# Patient Record
Sex: FEMALE | Race: WHITE | URBAN - METROPOLITAN AREA
[De-identification: names, ages, dates, MRNs, and addresses within clinical notes are randomized per-mention and may not be internally consistent; named-entity substitution may affect disease eponyms.]

---

## 2017-02-04 ASSESSMENT — ENCOUNTER SYMPTOMS
ARTHRALGIAS: 1
NECK PAIN: 0
SKIN CHANGES: 0
NAIL CHANGES: 0
STIFFNESS: 1
JOINT SWELLING: 0
MUSCLE CRAMPS: 1
MYALGIAS: 1
POOR WOUND HEALING: 1
BACK PAIN: 0
MUSCLE WEAKNESS: 0

## 2017-02-13 ENCOUNTER — PRE VISIT (OUTPATIENT)
Dept: CARDIOLOGY | Facility: CLINIC | Age: 62
End: 2017-02-13

## 2017-02-13 DIAGNOSIS — I27.20 PULMONARY HYPERTENSION (H): ICD-10-CM

## 2017-02-15 ENCOUNTER — OFFICE VISIT (OUTPATIENT)
Dept: CARDIOLOGY | Facility: CLINIC | Age: 62
End: 2017-02-15
Attending: INTERNAL MEDICINE
Payer: COMMERCIAL

## 2017-02-15 VITALS
BODY MASS INDEX: 35.06 KG/M2 | HEIGHT: 67 IN | WEIGHT: 223.4 LBS | OXYGEN SATURATION: 99 % | DIASTOLIC BLOOD PRESSURE: 73 MMHG | SYSTOLIC BLOOD PRESSURE: 107 MMHG | HEART RATE: 59 BPM

## 2017-02-15 DIAGNOSIS — I50.9 CONGESTIVE HEART FAILURE, UNSPECIFIED CONGESTIVE HEART FAILURE CHRONICITY, UNSPECIFIED CONGESTIVE HEART FAILURE TYPE: Primary | ICD-10-CM

## 2017-02-15 DIAGNOSIS — I50.9 CONGESTIVE HEART FAILURE, UNSPECIFIED CONGESTIVE HEART FAILURE CHRONICITY, UNSPECIFIED CONGESTIVE HEART FAILURE TYPE: ICD-10-CM

## 2017-02-15 LAB
ANION GAP SERPL CALCULATED.3IONS-SCNC: 9 MMOL/L (ref 3–14)
BASOPHILS # BLD AUTO: 0.1 10E9/L (ref 0–0.2)
BASOPHILS NFR BLD AUTO: 0.7 %
BUN SERPL-MCNC: 17 MG/DL (ref 7–30)
CALCIUM SERPL-MCNC: 9.1 MG/DL (ref 8.5–10.1)
CHLORIDE SERPL-SCNC: 104 MMOL/L (ref 94–109)
CHOLEST SERPL-MCNC: 131 MG/DL
CO2 SERPL-SCNC: 29 MMOL/L (ref 20–32)
CREAT SERPL-MCNC: 0.78 MG/DL (ref 0.52–1.04)
DIFFERENTIAL METHOD BLD: NORMAL
EOSINOPHIL # BLD AUTO: 0.3 10E9/L (ref 0–0.7)
EOSINOPHIL NFR BLD AUTO: 4.1 %
ERYTHROCYTE [DISTWIDTH] IN BLOOD BY AUTOMATED COUNT: 13 % (ref 10–15)
GFR SERPL CREATININE-BSD FRML MDRD: 75 ML/MIN/1.7M2
GLUCOSE SERPL-MCNC: 95 MG/DL (ref 70–99)
HCT VFR BLD AUTO: 40.3 % (ref 35–47)
HDLC SERPL-MCNC: 89 MG/DL
HGB BLD-MCNC: 13.8 G/DL (ref 11.7–15.7)
IMM GRANULOCYTES # BLD: 0 10E9/L (ref 0–0.4)
IMM GRANULOCYTES NFR BLD: 0.1 %
LDLC SERPL CALC-MCNC: 31 MG/DL
LYMPHOCYTES # BLD AUTO: 2.5 10E9/L (ref 0.8–5.3)
LYMPHOCYTES NFR BLD AUTO: 37.1 %
MCH RBC QN AUTO: 32.4 PG (ref 26.5–33)
MCHC RBC AUTO-ENTMCNC: 34.2 G/DL (ref 31.5–36.5)
MCV RBC AUTO: 95 FL (ref 78–100)
MONOCYTES # BLD AUTO: 0.6 10E9/L (ref 0–1.3)
MONOCYTES NFR BLD AUTO: 8.7 %
NEUTROPHILS # BLD AUTO: 3.3 10E9/L (ref 1.6–8.3)
NEUTROPHILS NFR BLD AUTO: 49.3 %
NONHDLC SERPL-MCNC: 42 MG/DL
NRBC # BLD AUTO: 0 10*3/UL
NRBC BLD AUTO-RTO: 0 /100
PLATELET # BLD AUTO: 221 10E9/L (ref 150–450)
POTASSIUM SERPL-SCNC: 3.9 MMOL/L (ref 3.4–5.3)
RBC # BLD AUTO: 4.26 10E12/L (ref 3.8–5.2)
SODIUM SERPL-SCNC: 141 MMOL/L (ref 133–144)
TRIGL SERPL-MCNC: 55 MG/DL
TSH SERPL DL<=0.005 MIU/L-ACNC: 3.53 MU/L (ref 0.4–4)
WBC # BLD AUTO: 6.8 10E9/L (ref 4–11)

## 2017-02-15 PROCEDURE — 99212 OFFICE O/P EST SF 10 MIN: CPT

## 2017-02-15 PROCEDURE — 36415 COLL VENOUS BLD VENIPUNCTURE: CPT | Performed by: INTERNAL MEDICINE

## 2017-02-15 PROCEDURE — 80061 LIPID PANEL: CPT | Performed by: INTERNAL MEDICINE

## 2017-02-15 PROCEDURE — 84443 ASSAY THYROID STIM HORMONE: CPT | Performed by: INTERNAL MEDICINE

## 2017-02-15 PROCEDURE — 99214 OFFICE O/P EST MOD 30 MIN: CPT | Mod: ZP | Performed by: INTERNAL MEDICINE

## 2017-02-15 PROCEDURE — 85025 COMPLETE CBC W/AUTO DIFF WBC: CPT | Performed by: INTERNAL MEDICINE

## 2017-02-15 PROCEDURE — 80048 BASIC METABOLIC PNL TOTAL CA: CPT | Performed by: INTERNAL MEDICINE

## 2017-02-15 RX ORDER — METOPROLOL SUCCINATE 50 MG/1
50 TABLET, EXTENDED RELEASE ORAL DAILY
Qty: 90 TABLET | Refills: 3 | Status: SHIPPED | OUTPATIENT
Start: 2017-02-15

## 2017-02-15 RX ORDER — CETIRIZINE HYDROCHLORIDE 10 MG/1
10 TABLET ORAL DAILY
COMMUNITY
Start: 2014-01-01

## 2017-02-15 RX ORDER — VALSARTAN 80 MG/1
80 TABLET ORAL DAILY
COMMUNITY
Start: 2016-01-01 | End: 2017-02-15

## 2017-02-15 RX ORDER — FUROSEMIDE 40 MG
40 TABLET ORAL DAILY
Qty: 90 TABLET | Refills: 3 | Status: SHIPPED | OUTPATIENT
Start: 2017-02-15

## 2017-02-15 RX ORDER — CHLORAL HYDRATE 500 MG
1500 CAPSULE ORAL DAILY
COMMUNITY
Start: 2015-01-01

## 2017-02-15 RX ORDER — METOPROLOL SUCCINATE 50 MG/1
50 TABLET, EXTENDED RELEASE ORAL DAILY
COMMUNITY
Start: 2016-03-18 | End: 2017-02-15

## 2017-02-15 RX ORDER — VALSARTAN 80 MG/1
80 TABLET ORAL DAILY
Qty: 90 TABLET | Refills: 3 | Status: SHIPPED | OUTPATIENT
Start: 2017-02-15

## 2017-02-15 RX ORDER — POTASSIUM CHLORIDE 1500 MG/1
20 TABLET, EXTENDED RELEASE ORAL DAILY
Qty: 90 TABLET | Refills: 3 | Status: SHIPPED | OUTPATIENT
Start: 2017-02-15

## 2017-02-15 RX ORDER — FEXOFENADINE HCL 180 MG/1
180 TABLET ORAL
COMMUNITY
Start: 2014-01-01

## 2017-02-15 ASSESSMENT — PAIN SCALES - GENERAL: PAINLEVEL: NO PAIN (0)

## 2017-02-15 NOTE — NURSING NOTE
Chief Complaint   Patient presents with     Follow Up For     heart problem--61 year old female with history of nonischemic cardiomyopathy presenting for follow up

## 2017-02-15 NOTE — MR AVS SNAPSHOT
After Visit Summary   2/15/2017    Glenna Kingston    MRN: [id number]           Patient Information     Date Of Birth          1955        Visit Information        Provider Department      2/15/2017 2:30 PM Joseph Pedroza MD [facility]        Today's Diagnoses     CHF (congestive heart failure) (H)    -  1      Care Instructions    You were seen today in the Cardiovascular Clinic at the [facility].     Cardiology Providers you saw during your visit: Dr Joseph Pedroza    Diagnosis: History of Cardiomyopathy    Results: No testing at this time    Recommendations:   1.  We will draw labs today- Lipids, CBC, BMP and TSH    Follow-up: No follow up needed at this time.        For emergencies call 911.    For any scheduling needs or nursing related questions, please call [phone].    Thank you for your visit today! If you have questions or concerns about today's visit, please call me.      Kulwinder Rodas RN  RN Care Coordinator  [facility]  [phone]    Press option 1 for the [employer] and then 3 for nursing related questions                Follow-ups after your visit        Follow-up notes from your care team     Return for Dr Pedroza Follow up Dx: CHF.      Future tests that were ordered for you today     Open Future Orders        Priority Expected Expires Ordered    Lipid panel reflex to direct LDL Routine  2/15/2017 2/15/2017    Basic metabolic panel Routine  2/15/2017 2/15/2017    CBC with platelets differential Routine  2/15/2017 2/15/2017    TSH with free T4 reflex Routine  2/15/2017 2/15/2017            Who to contact     If you have questions or need follow up information about today's clinic visit or your schedule please contact [facility] directly at [phone].  Normal or non-critical lab and imaging results will be communicated to you by MyChart, letter or phone within 4 business days after the clinic has received the results.  "If you do not hear from us within 7 days, please contact the clinic through [employer] or phone. If you have a critical or abnormal lab result, we will notify you by phone as soon as possible.  Submit refill requests through [employer] or call your pharmacy and they will forward the refill request to us. Please allow 3 business days for your refill to be completed.          Additional Information About Your Visit        [employer] Information     [employer] gives you secure access to your electronic health record. If you see a primary care provider, you can also send messages to your care team and make appointments. If you have questions, please call your primary care clinic.  If you do not have a primary care provider, please call [phone] and they will assist you.        Care EveryWhere ID     This is your Care EveryWhere ID. This could be used by other organizations to access your [place] medical records  [id number]        Your Vitals Were     Pulse Height Pulse Oximetry BMI (Body Mass Index)          59 1.702 m (5' 7\") 99% 34.99 kg/m2         Blood Pressure from Last 3 Encounters:   02/15/17 107/73   01/07/15 129/82   01/20/12 132/77    Weight from Last 3 Encounters:   02/15/17 101.3 kg (223 lb 6.4 oz)   01/07/15 128.5 kg (283 lb 4.8 oz)   01/20/12 131.3 kg (289 lb 6.4 oz)                 Where to get your medicines      These medications were sent to [id number] [address] AT [place] [address] [address], [place] [address]     Phone:  [phone]     furosemide 40 MG tablet    metoprolol 50 MG 24 hr tablet    potassium chloride SA 20 MEQ CR tablet    valsartan 80 MG tablet          Primary Care Provider Office Phone # Fax #    Shelia Vaughn [phone]       [facility] [address] [facility] [address]        Thank you!     Thank you for choosing [facility]  for your care. Our goal is always to " provide you with excellent care. Hearing back from our patients is one way we can continue to improve our services. Please take a few minutes to complete the written survey that you may receive in the mail after your visit with us. Thank you!             Your Updated Medication List - Protect others around you: Learn how to safely use, store and throw away your medicines at www.disposemymeds.org.          This list is accurate as of: 2/15/17  2:49 PM.  Always use your most recent med list.                   Brand Name Dispense Instructions for use    ALEVE 220 MG tablet   Generic drug:  naproxen sodium      Take 220 mg by mouth daily.       aspirin 81 MG tablet      Take 1 tablet by mouth daily.       cetirizine 10 MG tablet    zyrTEC     Take 10 mg by mouth daily       DAILY MULTIVITAMIN PO      Take  by mouth.       fexofenadine 180 MG tablet    ALLEGRA     Take 180 mg by mouth       furosemide 40 MG tablet    LASIX    90 tablet    Take 1 tablet (40 mg) by mouth daily       loratadine 10 MG capsule      Take 10 mg by mouth daily.       metoprolol 50 MG 24 hr tablet    TOPROL-XL    90 tablet    Take 1 tablet (50 mg) by mouth daily       Omega-3 Fish Oil 1000 MG Caps      Take 1,500 mg by mouth daily       potassium chloride SA 20 MEQ CR tablet    K-DUR/KLOR-CON M    90 tablet    Take 1 tablet (20 mEq) by mouth daily Only when taking furosemide       PROBIOTIC DAILY PO          valsartan 80 MG tablet    DIOVAN    90 tablet    Take 1 tablet (80 mg) by mouth daily

## 2017-02-15 NOTE — PATIENT INSTRUCTIONS
You were seen today in the Cardiovascular Clinic at the [facility].     Cardiology Providers you saw during your visit: Dr Joseph Pedroza    Diagnosis: History of Cardiomyopathy    Results: No testing at this time    Recommendations:   1.  We will draw labs today- Lipids, CBC, BMP and TSH    Follow-up: No follow up needed at this time.        For emergencies call 911.    For any scheduling needs or nursing related questions, please call [phone].    Thank you for your visit today! If you have questions or concerns about today's visit, please call me.      Kulwinder Rodas RN  RN Care Coordinator  [facility]  [phone]    Press option 1 for the [place] and then 3 for nursing related questions

## 2017-02-15 NOTE — PROGRESS NOTES
February 15, 2017      Shelia Vaughn PA-C    [facility]    [address] [place] [address]       RE: Glenna Kingston   MRN: [id number]   : 1955      Dear Ms. Vaughn:      It was a pleasure participating in the care of your patient, Ms. Glenna Kingston.  As you know, she is a 61-year-old lady who I see today for a prior nonischemic cardiomyopathy since resolved.      Her past medical history is significant for the followin.  Staph skin infections with debridement of leg abscess in the past.   2.  Psoriasis.   3.  Anxiety.      Her cardiac history is significant for a nonischemic cardiomyopathy in  which apparently resolved.  She saw Dr. Virginia Villagomez for this and had a right heart cath, at which time it did not show evidence for pulmonary hypertension or decline in her cardiac output.  She was maintained on beta blocker and angiotensin receptor blocker and instructed to continue to follow up.      I last saw her 2015, and at that time she was doing well.  She presents today for continuing care.      Since our last visit, she has managed to lose 60 pounds through diet and exercise.  She is feeling well and has questions regarding her overall medical regimen.      In terms of symptoms, she can walk for 30 minutes to an hour without stopping.  She denies gross chest pains, shortness of breath, PND, orthopnea, edema, palpitations, syncope or near-syncope.      In terms of her current medications, she is takin.  Metoprolol succinate 50 mg a day.   2.  Diovan 80 mg a day.   3.  Fish oil 1500 mg a day.   4.  Aspirin 81 mg a day.   5.  Lasix 40 mg a day.   6.  Potassium 20 mEq a day.      PHYSICAL EXAMINATION:     VITAL SIGNS:  Her blood pressure is 107/73 with a pulse of 59.  Weight is 223 pounds.   NECK:  Exam reveals a normal jugular venous pressure, no significant hepatojugular reflux is identified.  Her peripheral veins do not remain engorged above the cardiac  level.   LUNGS:  Clear to auscultation.  Respiratory effort is normal.   CARDIAC:  Reveals a regular rate and rhythm, no obvious murmur or gallop appreciated.   ABDOMEN:  Belly soft, nontender.   EXTREMITIES:  Left lower extremity is significant for +2 nonpitting edema, right lower extremity +1 nonpitting edema.        IMPRESSION:      Glenna is a 61-year-old lady whose cardiac history is significant for a verbal history of a cardiomyopathy of unclear etiology that resolved apparently back in 2004.  Documentation of this cardiomyopathy and accompanying workup is not available for review.      She saw Dr. Villagomez in 2011 and had a documented normal ejection fraction at that time.  She had a normal dobutamine stress echocardiogram 01/19/2015.      She presents today with questions regarding her overall medical regimen; however, from a clinical standpoint, she is currently asymptomatic at a moderate level of exertion.  She is currently euvolemic and doing well from an exertional standpoint.        PLAN:     1.  Continue present medications at present doses.     2.  She is fasting today and requests us to check a fasting lipid profile along with some routine labs including kidney function and potassium levels while she is on Lasix.     3.  She will also keep track of her blood pressures at home and gather information regarding what her blood pressures are in the resting state on her current medical regimen.  Further adjustments can be contemplated based on these results; however, in the context of her presumed nonischemic cardiomyopathy that required significant medical management in the past, I would not completely discontinue any medications and would hesitate to decrease her current doses unless she was experiencing side effects or other adverse symptomatology from them considering how favorable her clinical course has been thus far.      If her tests and clinical course are unremarkable, then she can follow up  with you on a long-term basis and can see me on an as-needed basis in the future.      Once again, it was a pleasure participating in the care of your patient, Ms. Glenna Kingston.  Please feel free to contact me anytime if you questions regarding her care in the future.      Sincerely,            Joseph Pedroza MD

## 2017-02-15 NOTE — LETTER
2/15/2017      RE: Glenna Kingston  [address] [facility] [address]       Dear Colleague,    Thank you for the opportunity to participate in the care of your patient, Glenna Kingston, at the [facility]. Please see a copy of my visit note below.    February 15, 2017      Shelia Vaughn PA-C    [facility]    [address] [place]  [address]       RE: Glenna Kingston   MRN: [id number]   : 1955      Dear MsDebo Roderick:      It was a pleasure participating in the care of your patient, Ms. Glenna Kingston.  As you know, she is a 61-year-old lady who I see today for a prior nonischemic cardiomyopathy since resolved.      Her past medical history is significant for the followin.  Staph skin infections with debridement of leg abscess in the past.   2.  Psoriasis.   3.  Anxiety.      Her cardiac history is significant for a nonischemic cardiomyopathy in  which apparently resolved.  She saw Dr. Virginia Villagomez for this and had a right heart cath, at which time it did not show evidence for pulmonary hypertension or decline in her cardiac output.  She was maintained on beta blocker and angiotensin receptor blocker and instructed to continue to follow up.      I last saw her 2015, and at that time she was doing well.  She presents today for continuing care.      Since our last visit, she has managed to lose 60 pounds through diet and exercise.  She is feeling well and has questions regarding her overall medical regimen.      In terms of symptoms, she can walk for 30 minutes to an hour without stopping.  She denies gross chest pains, shortness of breath, PND, orthopnea, edema, palpitations, syncope or near-syncope.      In terms of her current medications, she is takin.  Metoprolol succinate 50 mg a day.   2.  Diovan 80 mg a day.   3.  Fish oil 1500 mg a day.   4.  Aspirin 81 mg a day.   5.  Lasix 40 mg a day.   6.   Potassium 20 mEq a day.      PHYSICAL EXAMINATION:     VITAL SIGNS:  Her blood pressure is 107/73 with a pulse of 59.  Weight is 223 pounds.   NECK:  Exam reveals a normal jugular venous pressure, no significant hepatojugular reflux is identified.  Her peripheral veins do not remain engorged above the cardiac level.   LUNGS:  Clear to auscultation.  Respiratory effort is normal.   CARDIAC:  Reveals a regular rate and rhythm, no obvious murmur or gallop appreciated.   ABDOMEN:  Belly soft, nontender.   EXTREMITIES:  Left lower extremity is significant for +2 nonpitting edema, right lower extremity +1 nonpitting edema.        IMPRESSION:      Glenna is a 61-year-old lady whose cardiac history is significant for a verbal history of a cardiomyopathy of unclear etiology that resolved apparently back in 2004.  Documentation of this cardiomyopathy and accompanying workup is not available for review.      She saw Dr. Villagomez in 2011 and had a documented normal ejection fraction at that time.  She had a normal dobutamine stress echocardiogram 01/19/2015.      She presents today with questions regarding her overall medical regimen; however, from a clinical standpoint, she is currently asymptomatic at a moderate level of exertion.  She is currently euvolemic and doing well from an exertional standpoint.        PLAN:     1.  Continue present medications at present doses.     2.  She is fasting today and requests us to check a fasting lipid profile along with some routine labs including kidney function and potassium levels while she is on Lasix.     3.  She will also keep track of her blood pressures at home and gather information regarding what her blood pressures are in the resting state on her current medical regimen.  Further adjustments can be contemplated based on these results; however, in the context of her presumed nonischemic cardiomyopathy that required significant medical management in the past, I would not  completely discontinue any medications and would hesitate to decrease her current doses unless she was experiencing side effects or other adverse symptomatology from them considering how favorable her clinical course has been thus far.      If her tests and clinical course are unremarkable, then she can follow up with you on a long-term basis and can see me on an as-needed basis in the future.      Once again, it was a pleasure participating in the care of your patient, Ms. Glenna Kingston.  Please feel free to contact me anytime if you questions regarding her care in the future.      Sincerely,   Joseph Pedroza MD

## 2017-09-03 ENCOUNTER — HEALTH MAINTENANCE LETTER (OUTPATIENT)
Age: 62
End: 2017-09-03

## 2018-03-30 DIAGNOSIS — I50.9 CONGESTIVE HEART FAILURE, UNSPECIFIED CONGESTIVE HEART FAILURE CHRONICITY, UNSPECIFIED CONGESTIVE HEART FAILURE TYPE: ICD-10-CM

## 2018-04-02 RX ORDER — METOPROLOL SUCCINATE 50 MG/1
TABLET, EXTENDED RELEASE ORAL
Qty: 90 TABLET | Refills: 0 | OUTPATIENT
Start: 2018-04-02

## 2019-11-07 ENCOUNTER — HEALTH MAINTENANCE LETTER (OUTPATIENT)
Age: 64
End: 2019-11-07

## 2020-11-29 ENCOUNTER — HEALTH MAINTENANCE LETTER (OUTPATIENT)
Age: 65
End: 2020-11-29

## 2021-09-25 ENCOUNTER — HEALTH MAINTENANCE LETTER (OUTPATIENT)
Age: 66
End: 2021-09-25

## 2021-11-20 ENCOUNTER — HEALTH MAINTENANCE LETTER (OUTPATIENT)
Age: 66
End: 2021-11-20

## 2022-01-15 ENCOUNTER — HEALTH MAINTENANCE LETTER (OUTPATIENT)
Age: 67
End: 2022-01-15

## 2022-12-26 ENCOUNTER — HEALTH MAINTENANCE LETTER (OUTPATIENT)
Age: 67
End: 2022-12-26

## 2023-04-16 ENCOUNTER — HEALTH MAINTENANCE LETTER (OUTPATIENT)
Age: 68
End: 2023-04-16

## 2023-11-26 ENCOUNTER — HEALTH MAINTENANCE LETTER (OUTPATIENT)
Age: 68
End: 2023-11-26